# Patient Record
Sex: MALE | Race: WHITE | NOT HISPANIC OR LATINO | Employment: FULL TIME | URBAN - METROPOLITAN AREA
[De-identification: names, ages, dates, MRNs, and addresses within clinical notes are randomized per-mention and may not be internally consistent; named-entity substitution may affect disease eponyms.]

---

## 2023-02-15 PROBLEM — J30.1 SEASONAL ALLERGIC RHINITIS DUE TO POLLEN: Status: ACTIVE | Noted: 2023-02-15

## 2023-03-28 ENCOUNTER — CLINICAL SUPPORT (OUTPATIENT)
Dept: PRIMARY CARE | Facility: CLINIC | Age: 28
End: 2023-03-28
Payer: COMMERCIAL

## 2023-03-28 DIAGNOSIS — Z23 NEED FOR VACCINATION: ICD-10-CM

## 2023-03-28 PROCEDURE — 90471 IMMUNIZATION ADMIN: CPT | Performed by: FAMILY MEDICINE

## 2023-03-28 PROCEDURE — 90739 HEPB VACC 2/4 DOSE ADULT IM: CPT | Performed by: FAMILY MEDICINE

## 2023-04-27 ENCOUNTER — PATIENT MESSAGE (OUTPATIENT)
Dept: PRIMARY CARE | Facility: CLINIC | Age: 28
End: 2023-04-27
Payer: COMMERCIAL

## 2023-04-27 DIAGNOSIS — Z01.84 IMMUNITY STATUS TESTING: Primary | ICD-10-CM

## 2023-04-29 ENCOUNTER — LAB (OUTPATIENT)
Dept: LAB | Facility: LAB | Age: 28
End: 2023-04-29
Payer: COMMERCIAL

## 2023-04-29 DIAGNOSIS — Z01.84 IMMUNITY STATUS TESTING: ICD-10-CM

## 2023-04-29 LAB — HEPATITIS B VIRUS SURFACE AB (MIU/ML) IN SERUM: >1000 MIU/ML

## 2023-04-29 PROCEDURE — 36415 COLL VENOUS BLD VENIPUNCTURE: CPT

## 2023-04-29 PROCEDURE — 86706 HEP B SURFACE ANTIBODY: CPT

## 2023-08-10 ENCOUNTER — PATIENT MESSAGE (OUTPATIENT)
Dept: PRIMARY CARE | Facility: CLINIC | Age: 28
End: 2023-08-10
Payer: COMMERCIAL

## 2023-08-10 DIAGNOSIS — Z11.1 SCREENING FOR TUBERCULOSIS: Primary | ICD-10-CM

## 2023-08-19 ENCOUNTER — LAB (OUTPATIENT)
Dept: LAB | Facility: LAB | Age: 28
End: 2023-08-19
Payer: COMMERCIAL

## 2023-08-19 LAB — TOBACCO SCREEN, URINE: NEGATIVE

## 2023-08-24 ENCOUNTER — LAB (OUTPATIENT)
Dept: LAB | Facility: LAB | Age: 28
End: 2023-08-24
Payer: COMMERCIAL

## 2023-08-24 DIAGNOSIS — Z11.1 SCREENING FOR TUBERCULOSIS: ICD-10-CM

## 2023-08-24 PROCEDURE — 36415 COLL VENOUS BLD VENIPUNCTURE: CPT

## 2023-08-24 PROCEDURE — 86481 TB AG RESPONSE T-CELL SUSP: CPT

## 2023-08-26 LAB
NIL(NEG) CONTROL SPOT COUNT: NORMAL
PANEL A SPOT COUNT: 0
PANEL B SPOT COUNT: 1
POS CONTROL SPOT COUNT: NORMAL
T-SPOT. TB INTERPRETATION: NEGATIVE

## 2024-05-15 ENCOUNTER — APPOINTMENT (OUTPATIENT)
Dept: PRIMARY CARE | Facility: CLINIC | Age: 29
End: 2024-05-15
Payer: COMMERCIAL

## 2024-05-24 ENCOUNTER — OFFICE VISIT (OUTPATIENT)
Dept: PRIMARY CARE | Facility: CLINIC | Age: 29
End: 2024-05-24
Payer: COMMERCIAL

## 2024-05-24 VITALS
DIASTOLIC BLOOD PRESSURE: 72 MMHG | WEIGHT: 223.4 LBS | SYSTOLIC BLOOD PRESSURE: 132 MMHG | OXYGEN SATURATION: 97 % | BODY MASS INDEX: 28.67 KG/M2 | HEIGHT: 74 IN | HEART RATE: 63 BPM

## 2024-05-24 DIAGNOSIS — L50.0 ALLERGIC URTICARIA: ICD-10-CM

## 2024-05-24 DIAGNOSIS — L05.91 CHRONIC RECURRENT PILONIDAL CYST WITHOUT ABSCESS: ICD-10-CM

## 2024-05-24 DIAGNOSIS — Z00.00 WELL ADULT EXAM: Primary | ICD-10-CM

## 2024-05-24 DIAGNOSIS — L85.3 XEROSIS CUTIS: ICD-10-CM

## 2024-05-24 PROBLEM — J45.909 ASTHMA (HHS-HCC): Status: RESOLVED | Noted: 2024-05-24 | Resolved: 2024-05-24

## 2024-05-24 PROBLEM — J30.1 NON-SEASONAL ALLERGIC RHINITIS DUE TO POLLEN: Status: ACTIVE | Noted: 2024-05-24

## 2024-05-24 PROCEDURE — 99395 PREV VISIT EST AGE 18-39: CPT | Performed by: FAMILY MEDICINE

## 2024-05-24 PROCEDURE — 1036F TOBACCO NON-USER: CPT | Performed by: FAMILY MEDICINE

## 2024-05-24 RX ORDER — EPINEPHRINE 0.3 MG/.3ML
INJECTION INTRAMUSCULAR
COMMUNITY
Start: 2016-10-26

## 2024-05-24 ASSESSMENT — ENCOUNTER SYMPTOMS
SLEEP DISTURBANCE: 0
FATIGUE: 0
UNEXPECTED WEIGHT CHANGE: 0
SHORTNESS OF BREATH: 0
HEADACHES: 0
CONSTIPATION: 0
NERVOUS/ANXIOUS: 0
ROS GI COMMENTS: DENIES HEARTBURN
ABDOMINAL PAIN: 0
DIFFICULTY URINATING: 0
DYSPHORIC MOOD: 0
DIARRHEA: 0

## 2024-05-24 ASSESSMENT — PATIENT HEALTH QUESTIONNAIRE - PHQ9
2. FEELING DOWN, DEPRESSED OR HOPELESS: NOT AT ALL
SUM OF ALL RESPONSES TO PHQ9 QUESTIONS 1 AND 2: 0
1. LITTLE INTEREST OR PLEASURE IN DOING THINGS: NOT AT ALL

## 2024-05-24 NOTE — PROGRESS NOTES
"Subjective   Patient ID: Hayden Todd is a 29 y.o. male who presents for Annual Exam (PT is here for annual physical).  HPI  Hayden presents for wellness visit.  He has gone through some emotional distress with long time girlfriend.  Has gotten through a rough patch and things look better.  He still deals with significant allergic urticaria and rhinitis.  He has had some issues with recurrent pilonidal cyst and other skin issues.  Review of Systems   Constitutional:  Negative for fatigue and unexpected weight change.   Eyes:  Negative for visual disturbance.   Respiratory:  Negative for shortness of breath.    Cardiovascular:  Negative for chest pain.   Gastrointestinal:  Negative for abdominal pain, constipation and diarrhea.        Denies heartburn   Genitourinary:  Negative for difficulty urinating.   Skin:  Positive for rash.   Neurological:  Negative for headaches.   Psychiatric/Behavioral:  Negative for dysphoric mood and sleep disturbance. The patient is not nervous/anxious.        Objective   Vitals:    05/24/24 1012   BP: 132/72   Pulse: 63   SpO2: 97%   Weight: 101 kg (223 lb 6.4 oz)   Height: 1.88 m (6' 2\")      Physical Exam  Vitals reviewed.   Constitutional:       General: He is not in acute distress.     Appearance: Normal appearance.   HENT:      Head: Normocephalic and atraumatic.      Right Ear: External ear normal.      Left Ear: External ear normal.      Nose: Nose normal.      Mouth/Throat:      Mouth: Mucous membranes are moist.   Eyes:      Extraocular Movements: Extraocular movements intact.      Pupils: Pupils are equal, round, and reactive to light.   Cardiovascular:      Rate and Rhythm: Normal rate and regular rhythm.      Heart sounds: No murmur heard.     No friction rub. No gallop.   Pulmonary:      Effort: Pulmonary effort is normal.      Breath sounds: Normal breath sounds.   Abdominal:      General: There is no distension.      Palpations: Abdomen is soft.      Tenderness: There " is no abdominal tenderness.   Musculoskeletal:      Cervical back: Neck supple. No tenderness.   Skin:     General: Skin is warm and dry.   Neurological:      General: No focal deficit present.      Mental Status: He is alert and oriented to person, place, and time.   Psychiatric:         Mood and Affect: Mood normal.         Behavior: Behavior normal.         Assessment/Plan   There are no diagnoses linked to this encounter.    Problem List Items Addressed This Visit             ICD-10-CM    Allergic urticaria L50.0    Relevant Orders    Referral to Allergy    Chronic recurrent pilonidal cyst without abscess L05.91    Relevant Orders    Referral to Dermatology    Xerosis cutis L85.3    Relevant Orders    Referral to Dermatology     Other Visit Diagnoses         Codes    Well adult exam    -  Primary Z00.00    Overall in very good health.  Can follow up yearly.

## 2024-06-14 ENCOUNTER — PATIENT MESSAGE (OUTPATIENT)
Dept: PRIMARY CARE | Facility: CLINIC | Age: 29
End: 2024-06-14
Payer: COMMERCIAL

## 2024-11-20 ENCOUNTER — APPOINTMENT (OUTPATIENT)
Dept: DERMATOLOGY | Facility: CLINIC | Age: 29
End: 2024-11-20
Payer: COMMERCIAL

## 2024-11-20 DIAGNOSIS — L05.91 PILONIDAL CYST: ICD-10-CM

## 2024-11-20 DIAGNOSIS — L72.0 EPIDERMAL INCLUSION CYST: Primary | ICD-10-CM

## 2024-11-20 DIAGNOSIS — L70.0 ACNE VULGARIS: ICD-10-CM

## 2024-11-20 DIAGNOSIS — L30.0 NUMMULAR DERMATITIS: ICD-10-CM

## 2024-11-20 PROCEDURE — 1036F TOBACCO NON-USER: CPT | Performed by: DERMATOLOGY

## 2024-11-20 PROCEDURE — 99204 OFFICE O/P NEW MOD 45 MIN: CPT | Performed by: DERMATOLOGY

## 2024-11-20 RX ORDER — CETIRIZINE HYDROCHLORIDE 10 MG/1
10 TABLET ORAL DAILY
COMMUNITY

## 2024-11-20 RX ORDER — ADAPALENE AND BENZOYL PEROXIDE 3; 25 MG/G; MG/G
GEL TOPICAL
Qty: 60 G | Refills: 2 | Status: SHIPPED | OUTPATIENT
Start: 2024-11-20

## 2024-11-20 NOTE — PROGRESS NOTES
"Subjective     Hayden Todd is a 29 y.o. male who presents for the following: Suspicious Skin Lesion (Chest lesion. Superior Buttocks \"cyst\". Chaperone offered and declined.  ) and Acne (Back- pt currently using otc products. ).     Review of Systems:  No other skin or systemic complaints other than what is documented elsewhere in the note.    The following portions of the chart were reviewed this encounter and updated as appropriate:   Tobacco  Allergies  Meds  Problems  Med Hx  Surg Hx  Fam Hx                  Objective   Well appearing patient in no apparent distress; mood and affect are within normal limits.    A focused skin examination was performed. All findings within normal limits unless otherwise noted below.    Assessment/Plan   1. Epidermal inclusion cyst  Chest - Medial (Center)  Subcutaneous nodule with normal-appearing overlying skin and connecting pore    (lesion of patient's concern)  -Discussed nature of the condition  -Reassurance, recommend observation at this time    2. Pilonidal cyst  Gluteal Crease    Quiescent presently  Patient reports a total of 2 flares in their lifetime; likely triggered by a job where the patient had to sit all day long  Patient may contact me for clinda BP gel topically if flares recur    3. Nummular dermatitis  Left Upper Arm - Posterior, Right Upper Arm - Posterior  Clear today    Quiescent today  Patient manages with oat cream    4. Acne vulgaris  Left Lower Back, Left Upper Back, Right Lower Back, Right Upper Back  Scattered comedones and inflammatory papulopustules.    -Discussed diagnosis of acne  -Discussed natural history of condition and expectations for treatment  -Recommend to begin epiduo forte once daily  -Continue neutrogena cleanser with glycolic acid    Related Medications  adapalene-benzoyl peroxide 0.3-2.5 % gel with pump  Apply to the back once daily. May bleach fabrics.        Follow up in 3 months for acne  Discussed if there are any " changes or development of concerning symptoms (lesion/skin condition is changing, bleeding, enlarging, or worsening) the patient is to contact my office. The patient verbalizes understanding.    Verenice Dow MD  11/20/2024      Addendum: Adapalene 0.3% gel is not well covered by insurance. Rx for clinda BP gel sent to patient's pharmacy. Patient to notify me if this is over $40. Nurse to notify patient.    Verenice Dow MD  11/21/24    Addendum: Clinda BP gel is also $80. Re sent rx to [facility], should be $35 or less. If not, patient to notify me. Nurse to notify patient.    Verenice Dow MD  11/25/24

## 2024-11-21 RX ORDER — CLINDAMYCIN AND BENZOYL PEROXIDE 10; 50 MG/G; MG/G
GEL TOPICAL
Qty: 50 G | Refills: 2 | Status: SHIPPED | OUTPATIENT
Start: 2024-11-21

## 2024-12-03 ENCOUNTER — LAB (OUTPATIENT)
Dept: LAB | Facility: LAB | Age: 29
End: 2024-12-03
Payer: COMMERCIAL

## 2024-12-03 ENCOUNTER — APPOINTMENT (OUTPATIENT)
Dept: ALLERGY | Facility: CLINIC | Age: 29
End: 2024-12-03
Payer: COMMERCIAL

## 2024-12-03 VITALS
SYSTOLIC BLOOD PRESSURE: 140 MMHG | WEIGHT: 210 LBS | DIASTOLIC BLOOD PRESSURE: 91 MMHG | HEART RATE: 73 BPM | BODY MASS INDEX: 26.96 KG/M2

## 2024-12-03 DIAGNOSIS — L50.0 ALLERGIC URTICARIA: ICD-10-CM

## 2024-12-03 DIAGNOSIS — L50.8 CHRONIC URTICARIA: ICD-10-CM

## 2024-12-03 DIAGNOSIS — R53.83 OTHER FATIGUE: ICD-10-CM

## 2024-12-03 DIAGNOSIS — L50.8 CHRONIC URTICARIA: Primary | ICD-10-CM

## 2024-12-03 PROCEDURE — 85025 COMPLETE CBC W/AUTO DIFF WBC: CPT

## 2024-12-03 PROCEDURE — 80053 COMPREHEN METABOLIC PANEL: CPT

## 2024-12-03 PROCEDURE — 83520 IMMUNOASSAY QUANT NOS NONAB: CPT

## 2024-12-03 PROCEDURE — 36415 COLL VENOUS BLD VENIPUNCTURE: CPT

## 2024-12-03 PROCEDURE — 99244 OFF/OP CNSLTJ NEW/EST MOD 40: CPT | Performed by: ALLERGY & IMMUNOLOGY

## 2024-12-03 PROCEDURE — 85652 RBC SED RATE AUTOMATED: CPT

## 2024-12-03 PROCEDURE — 84443 ASSAY THYROID STIM HORMONE: CPT

## 2024-12-03 RX ORDER — LEVOCETIRIZINE DIHYDROCHLORIDE 5 MG/1
5 TABLET, FILM COATED ORAL DAILY
Qty: 30 TABLET | Refills: 11 | Status: SHIPPED | OUTPATIENT
Start: 2024-12-03 | End: 2025-12-03

## 2024-12-03 ASSESSMENT — ENCOUNTER SYMPTOMS: URTICARIA: 1

## 2024-12-03 NOTE — LETTER
December 3, 2024     Tarun Berry MD  [id number] [facility] [address]    Patient: Hayden Todd   YOB: 1995   Date of Visit: 12/3/2024       Dear Dr. Tarun Berry MD:    Thank you for referring Hayden Todd to me for evaluation. Below are my notes for this consultation.  If you have questions, please do not hesitate to call me. I look forward to following your patient along with you.       Sincerely,     Demian Delarosa MD      CC: No Recipients  ______________________________________________________________________________________    Subjective  Patient ID:   [id number]   Hayden Todd is a 29 y.o. male who presents for Hives (Pt presents today for problems with hives. Pt states that when his body temperature rise is when he breaks outs in hives ).    Chief Complaint   Patient presents with   • Hives     Pt presents today for problems with hives. Pt states that when his body temperature rise is when he breaks outs in hives           Hives      This patient is here to evaluate for:  Urticaria - sent by Dr. Berry.    Hives - since 2016.  Exercise induced  Worse in the winter  Ok when in the gym but then gets hives when he goes out in the cold    Plays a lot of sports and is ok in the summer.   Pics show flat, hives on forehead, neck - they are pruritic.   Duration 30 minutes.    Takes cetirizine daily. Increased it to 2 pills since last week.     Crease of elbow, face, and neck.    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 Needed benadryl.   So then increased zyrtec to 2 pills.   Uses benadryl prn - when he went to a concert.     First reaction, after a long bike ride. Head to toe  Lips and ear swelling.   Got steroid injection and it resolved.    His previous doctor, Dr. Solano, tested him + for mono     He has history of pollen allergy. Well controlled and no need to evaluate further.   Had dairy allergy as  a child - got hives, throat swelling - but resolved.    Sh: Graduated aug '24 and working    Fh: Mother with history of hives/itching  She reports to Hayden that she has elevated tryptase levels.  Brother - hives      Review of Systems   All other systems reviewed and are negative.        Objective    BP (!) 140/91 (BP Location: Right arm, Patient Position: Sitting, BP Cuff Size: Large adult)   Pulse 73   Wt 95.3 kg (210 lb)   BMI 26.96 kg/m²      Physical Exam  Constitutional:       General: He is not in acute distress.     Appearance: Normal appearance. He is not ill-appearing.   HENT:      Head: Normocephalic and atraumatic.      Right Ear: Tympanic membrane, ear canal and external ear normal.      Left Ear: Tympanic membrane, ear canal and external ear normal.      Nose: Nose normal. No congestion or rhinorrhea.      Mouth/Throat:      Mouth: Mucous membranes are moist.      Pharynx: Oropharynx is clear. No oropharyngeal exudate or posterior oropharyngeal erythema.   Eyes:      General:         Right eye: No discharge.         Left eye: No discharge.      Conjunctiva/sclera: Conjunctivae normal.   Cardiovascular:      Rate and Rhythm: Normal rate and regular rhythm.      Heart sounds: Normal heart sounds. No murmur heard.     No friction rub. No gallop.   Pulmonary:      Effort: Pulmonary effort is normal. No respiratory distress.      Breath sounds: Normal breath sounds. No stridor. No wheezing, rhonchi or rales.   Chest:      Chest wall: No tenderness.   Abdominal:      General: Abdomen is flat.      Palpations: Abdomen is soft.   Musculoskeletal:         General: Normal range of motion.      Cervical back: Normal range of motion and neck supple.   Lymphadenopathy:      Cervical: No cervical adenopathy.   Skin:     General: Skin is warm and dry.      Findings: No erythema, lesion or rash.   Neurological:      General: No focal deficit present.      Mental Status: He is alert. Mental status is at baseline.    Psychiatric:         Mood and Affect: Mood normal.         Behavior: Behavior normal.         Thought Content: Thought content normal.         Judgment: Judgment normal.            Current Outpatient Medications   Medication Sig Dispense Refill   • adapalene-benzoyl peroxide 0.3-2.5 % gel with pump Apply to the back once daily. May bleach fabrics. 60 g 2   • cetirizine (ZyrTEC) 10 mg tablet Take 1 tablet (10 mg) by mouth once daily.     • clindamycin-benzoyl peroxide (Benzaclin) gel Apply to the back once daily. May bleach fabrics. 50 g 2   • EPINEPHrine (EpiPen 2-Benjie) 0.3 mg/0.3 mL injection syringe as directed Injection as directed for 1 year       No current facility-administered medications for this visit.       Summary of the labs over the past 6 months:    No visits with results within 6 Month(s) from this visit.   Latest known visit with results is:   Lab on 08/24/2023   Component Date Value Ref Range Status   • T-SPOT. TB Interpretation 08/24/2023 Negative  Normal Value: Negative Final   • Panel A Spot Count 08/24/2023 0   Final   • Panel B Spot Count 08/24/2023 1   Final   • NIL(NEG) Control Spot Count 08/24/2023 Passed   Final   • POS Control Spot Count 08/24/2023 Passed   Final         Assessment/Plan  Diagnoses and all orders for this visit:  Chronic urticaria  -     CBC and Auto Differential; Future  -     Comprehensive Metabolic Panel; Future  -     Sedimentation Rate; Future  -     TSH with reflex to Free T4 if abnormal; Future  -     Tryptase; Future  -     levocetirizine (Xyzal) 5 mg tablet; Take 1 tablet (5 mg) by mouth once daily.  Allergic urticaria  -     Referral to Allergy  -     CBC and Auto Differential; Future  -     Comprehensive Metabolic Panel; Future  -     Sedimentation Rate; Future  -     TSH with reflex to Free T4 if abnormal; Future  -     Tryptase; Future  -     levocetirizine (Xyzal) 5 mg tablet; Take 1 tablet (5 mg) by mouth once daily.  Other fatigue  -     CBC and Auto  Differential; Future  -     Comprehensive Metabolic Panel; Future  -     Sedimentation Rate; Future  -     TSH with reflex to Free T4 if abnormal; Future  -     Tryptase; Future  -     levocetirizine (Xyzal) 5 mg tablet; Take 1 tablet (5 mg) by mouth once daily.    It was a pleasure to meet you and we are happy to welcome you to our office.     Fortunately,  the history did not identify any specific red flags of concern. Also there were no allergic triggers identified today associated with the allergic reaction; no allergy testing is indicated.  No anaphylaxis occurred and so we did not need to prescribe an epinephrine auto-injector.       With the history of his mom with elevated tryptase, will check this.     We will check laboratory studies to help determine the etiology of your symptoms.     Also, we started to focus on treating your problem and we reviewed the management plan.   We did discuss that common triggers for hives include heat, and pressure.       I recommend taking the following on a daily basis to PREVENT recurrences of hives or swelling:    Levocetirizine 5mg daily    See if this works better than the Zyrtec. And  you can also take one of each, if needed.     I would be happy to see you for follow-up as needed if the condition changes or worsens.     Demian Delarosa MD

## 2024-12-03 NOTE — PROGRESS NOTES
Subjective   Patient ID:   [id number]   Hayden Todd is a 29 y.o. male who presents for Hives (Pt presents today for problems with hives. Pt states that when his body temperature rise is when he breaks outs in hives ).    Chief Complaint   Patient presents with    Hives     Pt presents today for problems with hives. Pt states that when his body temperature rise is when he breaks outs in hives           Hives      This patient is here to evaluate for:  Urticaria - sent by Dr. Berry.    Hives - since 2016.  Exercise induced  Worse in the winter  Ok when in the gym but then gets hives when he goes out in the cold    Plays a lot of sports and is ok in the summer.   Pics show flat, hives on forehead, neck - they are pruritic.   Duration 30 minutes.    Takes cetirizine daily. Increased it to 2 pills since last week.     Crease of elbow, face, and neck.    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 Needed benadryl.   So then increased zyrtec to 2 pills.   Uses benadryl prn - when he went to a concert.     First reaction, after a long bike ride. Head to toe  Lips and ear swelling.   Got steroid injection and it resolved.    His previous doctor, Dr. Solano, tested him + for mono     He has history of pollen allergy. Well controlled and no need to evaluate further.   Had dairy allergy as a child - got hives, throat swelling - but resolved.    Sh: Graduated aug '24 and working    Fh: Mother with history of hives/itching  She reports to Hayden that she has elevated tryptase levels.  Brother - hives      Review of Systems   All other systems reviewed and are negative.        Objective     BP (!) 140/91 (BP Location: Right arm, Patient Position: Sitting, BP Cuff Size: Large adult)   Pulse 73   Wt 95.3 kg (210 lb)   BMI 26.96 kg/m²      Physical Exam  Constitutional:       General: He is not in acute distress.     Appearance: Normal appearance. He is not  ill-appearing.   HENT:      Head: Normocephalic and atraumatic.      Right Ear: Tympanic membrane, ear canal and external ear normal.      Left Ear: Tympanic membrane, ear canal and external ear normal.      Nose: Nose normal. No congestion or rhinorrhea.      Mouth/Throat:      Mouth: Mucous membranes are moist.      Pharynx: Oropharynx is clear. No oropharyngeal exudate or posterior oropharyngeal erythema.   Eyes:      General:         Right eye: No discharge.         Left eye: No discharge.      Conjunctiva/sclera: Conjunctivae normal.   Cardiovascular:      Rate and Rhythm: Normal rate and regular rhythm.      Heart sounds: Normal heart sounds. No murmur heard.     No friction rub. No gallop.   Pulmonary:      Effort: Pulmonary effort is normal. No respiratory distress.      Breath sounds: Normal breath sounds. No stridor. No wheezing, rhonchi or rales.   Chest:      Chest wall: No tenderness.   Abdominal:      General: Abdomen is flat.      Palpations: Abdomen is soft.   Musculoskeletal:         General: Normal range of motion.      Cervical back: Normal range of motion and neck supple.   Lymphadenopathy:      Cervical: No cervical adenopathy.   Skin:     General: Skin is warm and dry.      Findings: No erythema, lesion or rash.   Neurological:      General: No focal deficit present.      Mental Status: He is alert. Mental status is at baseline.   Psychiatric:         Mood and Affect: Mood normal.         Behavior: Behavior normal.         Thought Content: Thought content normal.         Judgment: Judgment normal.            Current Outpatient Medications   Medication Sig Dispense Refill    adapalene-benzoyl peroxide 0.3-2.5 % gel with pump Apply to the back once daily. May bleach fabrics. 60 g 2    cetirizine (ZyrTEC) 10 mg tablet Take 1 tablet (10 mg) by mouth once daily.      clindamycin-benzoyl peroxide (Benzaclin) gel Apply to the back once daily. May bleach fabrics. 50 g 2    EPINEPHrine (EpiPen 2-Benjie)  0.3 mg/0.3 mL injection syringe as directed Injection as directed for 1 year       No current facility-administered medications for this visit.       Summary of the labs over the past 6 months:    No visits with results within 6 Month(s) from this visit.   Latest known visit with results is:   Lab on 08/24/2023   Component Date Value Ref Range Status    T-SPOT. TB Interpretation 08/24/2023 Negative  Normal Value: Negative Final    Panel A Spot Count 08/24/2023 0   Final    Panel B Spot Count 08/24/2023 1   Final    NIL(NEG) Control Spot Count 08/24/2023 Passed   Final    POS Control Spot Count 08/24/2023 Passed   Final         Assessment/Plan   Diagnoses and all orders for this visit:  Chronic urticaria  -     CBC and Auto Differential; Future  -     Comprehensive Metabolic Panel; Future  -     Sedimentation Rate; Future  -     TSH with reflex to Free T4 if abnormal; Future  -     Tryptase; Future  -     levocetirizine (Xyzal) 5 mg tablet; Take 1 tablet (5 mg) by mouth once daily.  Allergic urticaria  -     Referral to Allergy  -     CBC and Auto Differential; Future  -     Comprehensive Metabolic Panel; Future  -     Sedimentation Rate; Future  -     TSH with reflex to Free T4 if abnormal; Future  -     Tryptase; Future  -     levocetirizine (Xyzal) 5 mg tablet; Take 1 tablet (5 mg) by mouth once daily.  Other fatigue  -     CBC and Auto Differential; Future  -     Comprehensive Metabolic Panel; Future  -     Sedimentation Rate; Future  -     TSH with reflex to Free T4 if abnormal; Future  -     Tryptase; Future  -     levocetirizine (Xyzal) 5 mg tablet; Take 1 tablet (5 mg) by mouth once daily.    It was a pleasure to meet you and we are happy to welcome you to our office.     Fortunately,  the history did not identify any specific red flags of concern. Also there were no allergic triggers identified today associated with the allergic reaction; no allergy testing is indicated.  No anaphylaxis occurred and so we  did not need to prescribe an epinephrine auto-injector.       With the history of his mom with elevated tryptase, will check this.     We will check laboratory studies to help determine the etiology of your symptoms.     Also, we started to focus on treating your problem and we reviewed the management plan.   We did discuss that common triggers for hives include heat, and pressure.       I recommend taking the following on a daily basis to PREVENT recurrences of hives or swelling:    Levocetirizine 5mg daily    See if this works better than the Zyrtec. And  you can also take one of each, if needed.     I would be happy to see you for follow-up as needed if the condition changes or worsens.     Demian Delarosa MD     12/12/24: Addendum: elevated tryptase, similar to his Mother's history of elevated tryptase.  Left vm (twice) and awaiting his call back.    12/12/24; Spoke with Hayden - doing well.   Playing soccer and using 2 cetirizine's and not affecting him.     We discussed that in some people, there is a genetic propensity to have mildly elevated tryptase levels. The way we treat this is as above.    I do not have concerns for a mastocytosis as you are not having any concerning symptoms including no breathing, gastrointestinal, bone pain or other issues. But, feel free to reach out if there are any changes.

## 2024-12-04 LAB
ALBUMIN SERPL BCP-MCNC: 4.8 G/DL (ref 3.4–5)
ALP SERPL-CCNC: 59 U/L (ref 33–120)
ALT SERPL W P-5'-P-CCNC: 14 U/L (ref 10–52)
ANION GAP SERPL CALC-SCNC: 14 MMOL/L (ref 10–20)
AST SERPL W P-5'-P-CCNC: 14 U/L (ref 9–39)
BASOPHILS # BLD AUTO: 0.03 X10*3/UL (ref 0–0.1)
BASOPHILS NFR BLD AUTO: 0.4 %
BILIRUB SERPL-MCNC: 0.6 MG/DL (ref 0–1.2)
BUN SERPL-MCNC: 13 MG/DL (ref 6–23)
CALCIUM SERPL-MCNC: 9.6 MG/DL (ref 8.6–10.6)
CHLORIDE SERPL-SCNC: 101 MMOL/L (ref 98–107)
CO2 SERPL-SCNC: 28 MMOL/L (ref 21–32)
CREAT SERPL-MCNC: 0.99 MG/DL (ref 0.5–1.3)
EGFRCR SERPLBLD CKD-EPI 2021: >90 ML/MIN/1.73M*2
EOSINOPHIL # BLD AUTO: 0.29 X10*3/UL (ref 0–0.7)
EOSINOPHIL NFR BLD AUTO: 4.2 %
ERYTHROCYTE [DISTWIDTH] IN BLOOD BY AUTOMATED COUNT: 11.9 % (ref 11.5–14.5)
ERYTHROCYTE [SEDIMENTATION RATE] IN BLOOD BY WESTERGREN METHOD: 4 MM/H (ref 0–15)
GLUCOSE SERPL-MCNC: 80 MG/DL (ref 74–99)
HCT VFR BLD AUTO: 43.2 % (ref 41–52)
HGB BLD-MCNC: 15.1 G/DL (ref 13.5–17.5)
IMM GRANULOCYTES # BLD AUTO: 0.03 X10*3/UL (ref 0–0.7)
IMM GRANULOCYTES NFR BLD AUTO: 0.4 % (ref 0–0.9)
LYMPHOCYTES # BLD AUTO: 1.89 X10*3/UL (ref 1.2–4.8)
LYMPHOCYTES NFR BLD AUTO: 27.1 %
MCH RBC QN AUTO: 30.1 PG (ref 26–34)
MCHC RBC AUTO-ENTMCNC: 35 G/DL (ref 32–36)
MCV RBC AUTO: 86 FL (ref 80–100)
MONOCYTES # BLD AUTO: 0.56 X10*3/UL (ref 0.1–1)
MONOCYTES NFR BLD AUTO: 8 %
NEUTROPHILS # BLD AUTO: 4.17 X10*3/UL (ref 1.2–7.7)
NEUTROPHILS NFR BLD AUTO: 59.9 %
NRBC BLD-RTO: 0 /100 WBCS (ref 0–0)
PLATELET # BLD AUTO: 227 X10*3/UL (ref 150–450)
POTASSIUM SERPL-SCNC: 4.2 MMOL/L (ref 3.5–5.3)
PROT SERPL-MCNC: 7.4 G/DL (ref 6.4–8.2)
RBC # BLD AUTO: 5.02 X10*6/UL (ref 4.5–5.9)
SODIUM SERPL-SCNC: 139 MMOL/L (ref 136–145)
TSH SERPL-ACNC: 2.29 MIU/L (ref 0.44–3.98)
WBC # BLD AUTO: 7 X10*3/UL (ref 4.4–11.3)

## 2024-12-05 LAB — TRYPTASE SERPL-MCNC: 15 UG/L

## 2025-04-01 ASSESSMENT — ENCOUNTER SYMPTOMS
LOSS OF SENSATION: 0
TINGLING: 0
MUSCLE WEAKNESS: 1
LOSS OF MOTION: 1
INABILITY TO BEAR WEIGHT: 1

## 2025-04-02 ENCOUNTER — OFFICE VISIT (OUTPATIENT)
Dept: PRIMARY CARE | Facility: CLINIC | Age: 30
End: 2025-04-02
Payer: COMMERCIAL

## 2025-04-02 ENCOUNTER — HOSPITAL ENCOUNTER (OUTPATIENT)
Dept: RADIOLOGY | Facility: CLINIC | Age: 30
Discharge: HOME | End: 2025-04-02
Payer: COMMERCIAL

## 2025-04-02 VITALS
HEIGHT: 76 IN | HEART RATE: 64 BPM | DIASTOLIC BLOOD PRESSURE: 86 MMHG | BODY MASS INDEX: 27.28 KG/M2 | OXYGEN SATURATION: 98 % | WEIGHT: 224 LBS | SYSTOLIC BLOOD PRESSURE: 138 MMHG

## 2025-04-02 DIAGNOSIS — M25.571 ACUTE RIGHT ANKLE PAIN: ICD-10-CM

## 2025-04-02 DIAGNOSIS — M25.571 ACUTE RIGHT ANKLE PAIN: Primary | ICD-10-CM

## 2025-04-02 PROCEDURE — 1036F TOBACCO NON-USER: CPT

## 2025-04-02 PROCEDURE — 99213 OFFICE O/P EST LOW 20 MIN: CPT

## 2025-04-02 PROCEDURE — 73610 X-RAY EXAM OF ANKLE: CPT | Mod: RIGHT SIDE | Performed by: RADIOLOGY

## 2025-04-02 PROCEDURE — 3008F BODY MASS INDEX DOCD: CPT

## 2025-04-02 PROCEDURE — 73610 X-RAY EXAM OF ANKLE: CPT | Mod: RT

## 2025-04-02 ASSESSMENT — ENCOUNTER SYMPTOMS
DEPRESSION: 0
OCCASIONAL FEELINGS OF UNSTEADINESS: 0
LOSS OF SENSATION IN FEET: 0

## 2025-04-02 ASSESSMENT — PATIENT HEALTH QUESTIONNAIRE - PHQ9
2. FEELING DOWN, DEPRESSED OR HOPELESS: NOT AT ALL
1. LITTLE INTEREST OR PLEASURE IN DOING THINGS: NOT AT ALL
SUM OF ALL RESPONSES TO PHQ9 QUESTIONS 1 AND 2: 0

## 2025-04-02 ASSESSMENT — COLUMBIA-SUICIDE SEVERITY RATING SCALE - C-SSRS
1. IN THE PAST MONTH, HAVE YOU WISHED YOU WERE DEAD OR WISHED YOU COULD GO TO SLEEP AND NOT WAKE UP?: NO
6. HAVE YOU EVER DONE ANYTHING, STARTED TO DO ANYTHING, OR PREPARED TO DO ANYTHING TO END YOUR LIFE?: NO
2. HAVE YOU ACTUALLY HAD ANY THOUGHTS OF KILLING YOURSELF?: NO

## 2025-04-02 NOTE — PROGRESS NOTES
"Subjective   Patient ID: Hayden Todd is a 29 y.o. male who presents for Ankle Pain (Pt is here for right ankle pain ).    Patient presents for complaints of right ankle pain since Monday. He states that he was playing soccer and a defender stepped on his ankle and rolled it. He states that he has been taking an Ibuprofen, Tylenol, and has been applying ice and heat. He states that it is feeling better, the swelling has improved, but is on his feet daily as a rad tech. He denies any numbness or tingling, and is able to weight bear at this time.       Objective   /86   Pulse 64   Ht 1.93 m (6' 4\")   Wt 102 kg (224 lb)   SpO2 98%   BMI 27.27 kg/m²     Physical Exam  Constitutional:       Appearance: Normal appearance.   HENT:      Head: Normocephalic and atraumatic.   Eyes:      Extraocular Movements: Extraocular movements intact.      Conjunctiva/sclera: Conjunctivae normal.      Pupils: Pupils are equal, round, and reactive to light.   Cardiovascular:      Rate and Rhythm: Normal rate and regular rhythm.      Pulses: Normal pulses.      Heart sounds: Normal heart sounds.   Musculoskeletal:         General: Swelling present.      Right ankle: Swelling (lateral aspect) present. Tenderness present over the lateral malleolus. Normal range of motion.   Skin:     General: Skin is warm.      Findings: No bruising or erythema.   Neurological:      General: No focal deficit present.      Mental Status: He is alert and oriented to person, place, and time.       Discussed with patient to continue wearing supportive brace, RICE therapy, and obtain ankle x-ray. I will review the images, and if there is a noted fracture I discussed that he will be non-weight bearing and will be referred to orthopedics.     Assessment/Plan   Problem List Items Addressed This Visit             ICD-10-CM    Acute right ankle pain - Primary M25.571    Relevant Orders    XR ankle right 3+ views          "
97.6